# Patient Record
(demographics unavailable — no encounter records)

---

## 2025-03-20 NOTE — HISTORY OF PRESENT ILLNESS
[FreeTextEntry1] : 67f no sig PMH, presenting for colon cancer screening. Pt denies abdominal pain, rectal bleeding, change in bowel habits, or unexplained weight loss.    Last colonoscopy: 10 y ago reportedly WNL  Soc:  no tobacco or significant EtOH FHx: no FHx GI malignancy or IBD  ROS: Constitutional:: no weight loss, fevers ENT: no deafness Eyes: not blind Neck: no LN Chest: no dyspnea/cough Cardiac: no chest pain Vascular: no leg swelling GI: no abdominal pain, nausea, vomiting, diarrhea, constipation, rectal bleeding, dysphagia, melena unless otherwise noted in HPI : no dysuria, dark urine Skin: no rashes, jaundice Heme: no bleeding Endocrine: no DM unless otherwise stated in HPI  Px: (VS noted below) General: NAD Eyes: anicteric Oropharynx:  clear Neck: no LN Chest: normal respiratory effort CVS: regular Abd: soft, NT, ND, +BS, no HSM Ext: no atrophy Neuro: grossly nonfocal  Labs/imaging/prior endoscopic results reviewed to the extent available and noted in HPI

## 2025-03-20 NOTE — CONSULT LETTER
[FreeTextEntry1] : Dear Dr. Herrera to Collect PCP ,  I had the pleasure of evaluating your patient,  URSULA ALANIZ.  Please refer to my note below.  Thank you very much for allowing me to participate in the care of this patient.  If you have any questions, please do not hesitate to contact me.  Sincerely,   Mariano Ramos MD

## 2025-03-20 NOTE — ASSESSMENT
[FreeTextEntry1] : Colon cancer screening - colonoscopy due - Colonoscopy scheduled - Risks, benefits, alternatives were discussed, including but not limited to bleeding, infection, perforation and sedation risks. Additionally, the possibility of missed lesions was conveyed.  PMD/consultation/hospital notes and Labs/imaging/prior endoscopic results reviewed to extent noted in HPI; and, if procedure code billed on this visit for lab draw, this serves to signify that labs were drawn here in this office. Pt also advised that any studies ordered, if prior authorization required it may take up to a week to confirm - and if pt has not heard RE: scheduling by a week, they should call this office.

## 2025-03-20 NOTE — REASON FOR VISIT
[Consultation] : a consultation visit [FreeTextEntry1] : Kindly asked by none  to consult and evaluate patient for                     A copy of this note is being sent to physician requesting consultation.